# Patient Record
Sex: FEMALE | Race: WHITE
[De-identification: names, ages, dates, MRNs, and addresses within clinical notes are randomized per-mention and may not be internally consistent; named-entity substitution may affect disease eponyms.]

---

## 2024-01-03 ENCOUNTER — NURSE TRIAGE (OUTPATIENT)
Dept: OTHER | Facility: CLINIC | Age: 49
End: 2024-01-03

## 2024-01-03 NOTE — TELEPHONE ENCOUNTER
[place]    Received call from Lizzy at [facility]; Patient with Red Flag Complaint requesting to establish care with where ever.    Subjective: Caller states \"left sided pain where arm pit and arm connect.\"     Current Symptoms: left sided pain, where arm pit and breast connect. Episodes last less than 5 minutes.    Hurts to breath and cough    Onset: Monday evening  intermittent    Associated Symptoms: NA    Pain Severity: 0-10/10; sharp, spasm; intermittent    Temperature: none     What has been tried: BC powder, Aleve    LMP: NA Pregnant: NA    Recommended disposition: Go to ED Now- uncle will take    Care advice provided, patient verbalizes understanding; denies any other questions or concerns; instructed to call back for any new or worsening symptoms.    Patient/Caller agrees with recommended disposition; writer provided warm transfer to Shahrzad at [facility] for appointment scheduling    Attention Provider:  Thank you for allowing me to participate in the care of your patient.  The patient was connected to triage in response to information provided to the [facility].  Please do not respond through this encounter as the response is not directed to a shared pool.        Reason for Disposition   SEVERE chest pain    Protocols used: Chest Pain-ADULT-[place]